# Patient Record
Sex: MALE | Race: WHITE | Employment: OTHER | URBAN - METROPOLITAN AREA
[De-identification: names, ages, dates, MRNs, and addresses within clinical notes are randomized per-mention and may not be internally consistent; named-entity substitution may affect disease eponyms.]

---

## 2017-07-20 DIAGNOSIS — F32.1 MAJOR DEPRESSIVE DISORDER, SINGLE EPISODE, MODERATE (H): Primary | ICD-10-CM

## 2017-07-20 LAB
DEPRECATED CALCIDIOL+CALCIFEROL SERPL-MC: 46 UG/L (ref 20–75)
FERRITIN SERPL-MCNC: 213 NG/ML (ref 26–388)
IRON SERPL-MCNC: 85 UG/DL (ref 35–180)
T4 FREE SERPL-MCNC: 0.82 NG/DL (ref 0.76–1.46)
TSH SERPL DL<=0.05 MIU/L-ACNC: 2.19 MU/L (ref 0.4–4)

## 2017-07-20 PROCEDURE — 83540 ASSAY OF IRON: CPT | Performed by: INTERNAL MEDICINE

## 2017-07-20 PROCEDURE — 84443 ASSAY THYROID STIM HORMONE: CPT | Performed by: INTERNAL MEDICINE

## 2017-07-20 PROCEDURE — 82728 ASSAY OF FERRITIN: CPT | Performed by: INTERNAL MEDICINE

## 2017-07-20 PROCEDURE — 36415 COLL VENOUS BLD VENIPUNCTURE: CPT | Performed by: INTERNAL MEDICINE

## 2017-07-20 PROCEDURE — 82306 VITAMIN D 25 HYDROXY: CPT | Performed by: INTERNAL MEDICINE

## 2017-07-20 PROCEDURE — 84439 ASSAY OF FREE THYROXINE: CPT | Performed by: INTERNAL MEDICINE

## 2019-05-02 NOTE — PROGRESS NOTES
" [facility]  [address], [address]  [place] [address]  New Patient Evaluation       Simon Mayers is a 48 year old male who prefers the name Simon and pronoun he.  Therapist: had until recently but did not feel good fit. Started with a new therapist recently.  PCP: Nelly Leroy  Other Providers: Sofia Keith listed,  Last saw one month, found her office inconvenient.  Referred by self for evaluation of depression.     History was provided by patient who was a good historian.      Chief Complaint                                                                                                        \" I just want a magic wand to cut through the fog \"     History of Present Illness                                                                                4, 4      Most recent history   \"I've been battling this since 2014.\" Cognitive \"fog\", able to follow routine, fatigue, hypersomnia, amotivation.  A lot of urge to withdraw, not so much agoraphobic, but just wanting to be at home.  Not really a sadness, more of a numbness, going through the motions.  Feels it is seasonal, worse in nation, \"they crush me\". Uses light box, not sure if it helps.  Reviewed his medications and past tx trials, see below.   SI: \"I;m tired of life, but I have family. Otherwise I'd take a bottle of pills and be done.\" Does think thse thoughts have increased, \"I think something could dramatically change and I might consider doing it.\"      Psychiatric Review of Symptoms:   Depression: Positive for, anhedonia, social isolation, loss of appetite,, hypersomnia, fatigue, feeling worthless, guilt, poor concentration, indecisiveness, passive thoughts of death, Anxiety: Positive for symptoms of anxiety including panic attacks, \"dull\" anxity, generalized worry, restlessness, fatigue, poor concentration, irritability, muscle tension and insomnia     Describes \"OCD\" at past points, but this is not persistent checking or " "ritualizing, it is more of an [employer] perfectionism, xiang around work. Also around picking care providers.      PTSD: Denies traumatic experience of sufficient severity to meet criterion A necessary for diagnosis of PTSD.   Zaida: Negative  Psychosis: Negative   Eating disorder: Negative  Homicidal Ideation: Negative         Recent Substance Use:  Off and on drinkign, as recently as a year ago \"I'd have five at a time\", currently \"I'm done\", not using alcohol.  Cannabis would sometimes reduce the \"funk\" but not reliably. Too often got high.  CBD helps reduce stress, xiang when working, not using recently.        Substance Use History     Past Use- Reports significant use of alcohol in response to mid-2010s stressors.  Otherwise, as above.       Psychiatric History     Suicidal ideation- see HPI   Suicide Attempt:   #- denies   Zaida- [place] \"high like I could do anything\", for a few months, sleeping 7hr/d, thinks may have been impulsively accepting jobs, make purchases with less thought/planning than usual. More goal-directed activity than usual. More seeking of sex, alcohol use. Might have correlated with escitalopram but not sure.    He thinks this has happened in the past \"But I'm sure mayra was involved.\"    Psychosis- None      Outpatient Programs [ DBT, Day Treatment, Eating Disorder Tx etc]- Started a day tx program at [facility] [place], did the 9-12 partial, found it very fatiguing, also did not find it sufficiently informative. Went for about a month then stopped.    Past diagnoses of adult ADD (possible), depression.       Psychiatric Medication Trials           Drug Duration (mos) Dose (mg) Helpful (Y/N) Adverse Effects DC Reason / Date   bupropion   N Felt he got agited, combo with Lexapro    Citalopram   Y,   \"like someone flipped a switch\" GI side effects    Fluoxetine  40mg Partial, \"got me functional\"  Still the current medication.   Escitalopram   Not as much as citalopram, but reduced anxiety  \"It just quit working " "last summer\"   Naltrexone (atop fluoxetine)   N     Stimulants    \"jacked me out of my mind\"                                               Social/ Family History               [per patient report]                                                 1ea, 1ea     FINANCIAL SUPPORT- working,        RELATIONSHIPS/CHILDREN- Has had some intimate/romantic relationships, but nothing recent or long term.       LIVING SITUATION- Has own home      LEGAL- None  EARLY HISTORY/ EDUCATION- Graduated college.  SOCIAL/ SPIRITUAL SUPPORT- UNKNOWN       CULTURAL INFLUENCES/ IMPACT- UNKNOWN       TRAUMA HISTORY (self-report)- Not so much Criterion A trauma, but many financial-social stressors in mid-2010s.  FEELS SAFE AT HOME- Yes  FAMILY HISTORY-  UNKNOWN       Medical / Surgical History     Patient Active Problem List   Diagnosis     PARTHA (obstructive sleep apnea)       Past Surgical History:   Procedure Laterality Date     HC REMOVAL OF TONSILS,14+ Y/O           Medical Review of Systems                                                                                                 2, 10     GENERAL: overall fatigue  HEENT: No changes in hearing or vision, no nose bleeds or other nasal problems  NECK: Negative for lumps, goiter, pain and significant neck swelling  RESPIRATORY: Negative for cough, wheezing and shortness of breath  CARDIOVASCULAR: Negative for chest pain, leg swelling and palpitations  GI: Negative for abdominal discomfort, blood in stools or black stools  : Negative for dysuria, frequency and incontinence  MUSCULOSKELETAL: Negative for joint pain or swelling, back pain, and muscle pain.  SKIN: Negative for lesions, rash, and itching.  PSYCH: See HPI  HEMATOLOGY/LYMPHOLOGY Negative for prolonged bleeding, bruising easily, and swollen nodes.  ENDOCRINE: Negative for cold or heat intolerance, polyuria, polydipsia and goiter.  NEURO:  No seizure, paralysis, involuntary movements      Metals Screen   Yes No " Item    X Implanted or lodged metals in body    X Implanted surgical devices    X Metal containing facial or scalp tattoos    X Non removable piercings   Seizure Screen  Yes No Item    X Current Seizure Disorder?    X History of Seizure?     Does patient have a cochlear implant? ____X______  Does patient have any shunts?____X_______  Does patient have a pacemaker?___X_______  Does patient have a vagus nerve stimulator?___X_______  Does patient have a deep brain stimulator?____X______  Any other implanted device?________X________       Allergy   No known drug allergies     Current Medications     Current Outpatient Medications   Medication Sig Dispense Refill     CITALOPRAM HYDROBROMIDE PO Take 40 mg by mouth daily        Ibuprofen (ADVIL PO) Take 600 mg by mouth daily       Omega-3 Fatty Acids (OMEGA-3 FISH OIL PO) Take 750 mg by mouth          Vitals                                                                                                                        3, 3     /76   Pulse 76        Mental Status Exam                                                                                   9, 14 cog gs     Alertness: alert  and oriented  Appearance: well groomed  Behavior/Demeanor: cooperative, pleasant and calm, with good  eye contact   Speech: normal and regular rate and rhythm  Language: intact  Psychomotor: normal or unremarkable  Mood: depressed and worried  Affect: restricted; was congruent to mood; was congruent to content  Thought Process/Associations: unremarkable  Thought Content:  Reports none;  Denies suicidal and violent ideation  Perception:  Reports none;  Denies auditory hallucinations and visual hallucinations  Insight: good  Judgment: good  Cognition: (6) does  appear grossly intact; formal cognitive testing was not done  Gait and Station: unremarkable     Labs and Data     Rating Scales:    Not done due to rooming difficulty    No lab results found.  No lab results  found.    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 He has some response to serotonergic agents, but appears to be substantially limited by side effects. At this point in his treatment, consideration of an alternate agent is reasonable. He is particularly interested in trying ketamine, given the potential for a rapid response that would then enable him to bridge to more traditional therapy. He is self-employed and has a schedule that could permit ketamine use.     The risks, benefits, alternatives and potential adverse effects have been explained and are understood by the patient. Simon Mayers agrees to the treatment plan with the ability to do so. The pt knows to call the clinic for any problems or access emergency care if needed. There are no medical considerations relevant to treatment, as noted above. Substance use is not a problem as noted above.       In parallel, use of augmenting strategies for his MDD is a very reasonable approach that is likely to boost remission.    The patient understands that treatment consists of up to 37 treatment sessions. The patient cannot miss more than two sessions during treatment course, or course will be invalidated. The patient may not drink any alcohol or use any illicit drugs during treatment. The patient may not make any medication changes during the course of treatment. After treatment is complete, the patient will transfer back to the referring provider.     Suicide Risk Assessment:  Today Simon Mayers reports passive but not active SI. There is no evidence for an elevated risk level requiring management.        Plan                                                                                                                     m2, h3     1) Major  depressive disorder, recurrent, severe  -- Medications:  He should consider augmentation of serotonin specific reuptake inhibitor. I would suggest the trial of the following strategies, in approximately this order:  1) Switch back to citalopram and see if it is tolerable without SEs.  2) If citalopram is not tolerable, switch to vortioxetine and maximize dose (chance for cognitive benefit)  3) Once either of the following is at maximum tolerated dose, add aripiprazole 5mg (or higher if tolerated) for augmentation  4) If no help from aripiprazole, add T3 as per STAR*D algorithm    -- Interventional psychiatry: he would prefer to try ketamine over TMS, assuming insurance will cover it. He has no preference on infusion vs nasal. We will begin prior authorization process.    RTC: As needed, or once an interventional treatment is scheduled.    CRISIS NUMBERS:   Provided routinely in AVS.    Treatment Risk Statement:  The patient understands the risks, benefits, adverse effects and alternatives. Agrees to treatment with the capacity to do so. No medical contraindications to treatment. Agrees to call clinic for any problems. The patient understands to call 911 or go to the nearest ED if life threatening or urgent symptoms occur.     WHODAS 2.0  TODAY total score = N/A; [a 12-item WHODAS 2.0 assessment was not completed by the pt today and/or recorded in EPIC].     PROVIDER:  Ernst Lincoln MD    Time based billing; 50min spent face to face with the patient with greater than 50% of time spent on  coordination of care, counseling and discussion of treatment alternatives.

## 2019-05-03 ENCOUNTER — OFFICE VISIT (OUTPATIENT)
Dept: PSYCHIATRY | Facility: CLINIC | Age: 49
End: 2019-05-03
Payer: COMMERCIAL

## 2019-05-03 VITALS — DIASTOLIC BLOOD PRESSURE: 76 MMHG | HEART RATE: 76 BPM | SYSTOLIC BLOOD PRESSURE: 128 MMHG

## 2019-05-03 DIAGNOSIS — F33.1 MODERATE EPISODE OF RECURRENT MAJOR DEPRESSIVE DISORDER (H): Primary | ICD-10-CM

## 2019-05-03 RX ORDER — FLUOXETINE 40 MG/1
40 CAPSULE ORAL DAILY
COMMUNITY
End: 2019-09-05

## 2019-05-03 NOTE — LETTER
"5/3/2019      RE: Simon Mayers  [address]  [facility] [id number]        [facility]  [address], [address]  [place] [address]  New Patient Evaluation       Simon Mayers is a 48 year old male who prefers the name Simon and pronoun brooks.  Therapist: had until recently but did not feel good fit. Started with a new therapist recently.  PCP: Nelly Leroy  Other Providers: Sofia Parker listed,  Last saw one month, found her office inconvenient.  Referred by self for evaluation of depression.     History was provided by patient who was a good historian.      Chief Complaint                                                                                                        \" I just want a magic wand to cut through the fog \"     History of Present Illness                                                                                4, 4      Most recent history   \"I've been battling this since 2014.\" Cognitive \"fog\", able to follow routine, fatigue, hypersomnia, amotivation.  A lot of urge to withdraw, not so much agoraphobic, but just wanting to be at home.  Not really a sadness, more of a numbness, going through the motions.  Feels it is seasonal, worse in [place], \"they crush me\". Uses light box, not sure if it helps.  Reviewed his medications and past tx trials, see below.   SI: \"I;m tired of life, but I have family. Otherwise I'd take a bottle of pills and be done.\" Does think thse thoughts have increased, \"I think something could dramatically change and I might consider doing it.\"      Psychiatric Review of Symptoms:   Depression: Positive for, anhedonia, social isolation, loss of appetite,, hypersomnia, fatigue, feeling worthless, guilt, poor concentration, indecisiveness, passive thoughts of death, Anxiety: Positive for symptoms of anxiety including panic attacks, \"dull\" anxity, generalized worry, restlessness, fatigue, poor concentration, irritability, muscle tension and " "insomnia     Describes \"OCD\" at past points, but this is not persistent checking or ritualizing, it is more of an [employer] perfectionism, xiang around work. Also around picking care providers.      PTSD: Denies traumatic experience of sufficient severity to meet criterion A necessary for diagnosis of PTSD.   Zaida: Negative  Psychosis: Negative   Eating disorder: Negative  Homicidal Ideation: Negative         Recent Substance Use:  Off and on drinkign, as recently as a year ago \"I'd have five at a time\", currently \"I'm done\", not using alcohol.  Cannabis would sometimes reduce the \"funk\" but not reliably. Too often got high.  CBD helps reduce stress, xiang when working, not using recently.        Substance Use History     Past Use- Reports significant use of alcohol in response to mid-2010s stressors.  Otherwise, as above.       Psychiatric History     Suicidal ideation- see HPI   Suicide Attempt:   #- denies   Zaida- [place] \"high like I could do anything\", for a few months, sleeping 7hr/d, thinks may have been impulsively accepting jobs, make purchases with less thought/planning than usual. More goal-directed activity than usual. More seeking of sex, alcohol use. Might have correlated with escitalopram but not sure.    He thinks this has happened in the past \"But I'm sure hilariabelkisjesús was involved.\"    Psychosis- None      Outpatient Programs [ DBT, Day Treatment, Eating Disorder Tx etc]- Started a day tx program at [facility] [place], did the 9-12 partial, found it very fatiguing, also did not find it sufficiently informative. Went for about a month then stopped.    Past diagnoses of adult ADD (possible), depression.       Psychiatric Medication Trials           Drug Duration (mos) Dose (mg) Helpful (Y/N) Adverse Effects DC Reason / Date   bupropion   N Felt he got agited, combo with Lexapro    Citalopram   Y,   \"like someone flipped a switch\" GI side effects    Fluoxetine  40mg Partial, \"got me functional\"  Still the current medication. " "  Escitalopram   Not as much as citalopram, but reduced anxiety  \"It just quit working last summer\"   Naltrexone (atop fluoxetine)   N     Stimulants    \"jacked me out of my mind\"                                               Social/ Family History               [per patient report]                                                 1ea, 1ea     FINANCIAL SUPPORT- working,        RELATIONSHIPS/CHILDREN- Has had some intimate/romantic relationships, but nothing recent or long term.       LIVING SITUATION- Has own home      LEGAL- None  EARLY HISTORY/ EDUCATION- Graduated college.  SOCIAL/ SPIRITUAL SUPPORT- UNKNOWN       CULTURAL INFLUENCES/ IMPACT- UNKNOWN       TRAUMA HISTORY (self-report)- Not so much Criterion A trauma, but many financial-social stressors in mid-2010s.  FEELS SAFE AT HOME- Yes  FAMILY HISTORY-  UNKNOWN       Medical / Surgical History     Patient Active Problem List   Diagnosis     PARTHA (obstructive sleep apnea)       Past Surgical History:   Procedure Laterality Date     HC REMOVAL OF TONSILS,14+ Y/O           Medical Review of Systems                                                                                                 2, 10     GENERAL: overall fatigue  HEENT: No changes in hearing or vision, no nose bleeds or other nasal problems  NECK: Negative for lumps, goiter, pain and significant neck swelling  RESPIRATORY: Negative for cough, wheezing and shortness of breath  CARDIOVASCULAR: Negative for chest pain, leg swelling and palpitations  GI: Negative for abdominal discomfort, blood in stools or black stools  : Negative for dysuria, frequency and incontinence  MUSCULOSKELETAL: Negative for joint pain or swelling, back pain, and muscle pain.  SKIN: Negative for lesions, rash, and itching.  PSYCH: See HPI  HEMATOLOGY/LYMPHOLOGY Negative for prolonged bleeding, bruising easily, and swollen nodes.  ENDOCRINE: Negative for cold or heat intolerance, polyuria, polydipsia and " goiter.  NEURO:  No seizure, paralysis, involuntary movements      Metals Screen   Yes No Item    X Implanted or lodged metals in body    X Implanted surgical devices    X Metal containing facial or scalp tattoos    X Non removable piercings   Seizure Screen  Yes No Item    X Current Seizure Disorder?    X History of Seizure?     Does patient have a cochlear implant? ____X______  Does patient have any shunts?____X_______  Does patient have a pacemaker?___X_______  Does patient have a vagus nerve stimulator?___X_______  Does patient have a deep brain stimulator?____X______  Any other implanted device?________X________       Allergy   No known drug allergies     Current Medications     Current Outpatient Medications   Medication Sig Dispense Refill     CITALOPRAM HYDROBROMIDE PO Take 40 mg by mouth daily        Ibuprofen (ADVIL PO) Take 600 mg by mouth daily       Omega-3 Fatty Acids (OMEGA-3 FISH OIL PO) Take 750 mg by mouth          Vitals                                                                                                                        3, 3     /76   Pulse 76        Mental Status Exam                                                                                   9, 14 cog gs     Alertness: alert  and oriented  Appearance: well groomed  Behavior/Demeanor: cooperative, pleasant and calm, with good  eye contact   Speech: normal and regular rate and rhythm  Language: intact  Psychomotor: normal or unremarkable  Mood: depressed and worried  Affect: restricted; was congruent to mood; was congruent to content  Thought Process/Associations: unremarkable  Thought Content:  Reports none;  Denies suicidal and violent ideation  Perception:  Reports none;  Denies auditory hallucinations and visual hallucinations  Insight: good  Judgment: good  Cognition: (6) does  appear grossly intact; formal cognitive testing was not done  Gait and Station: unremarkable     Labs and Data     Rating Scales:    Not  done due to rooming difficulty    No lab results found.  No lab results found.    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 He has some response to serotonergic agents, but appears to be substantially limited by side effects. At this point in his treatment, consideration of an alternate agent is reasonable. He is particularly interested in trying ketamine, given the potential for a rapid response that would then enable him to bridge to more traditional therapy. He is self-employed and has a schedule that could permit ketamine use.     The risks, benefits, alternatives and potential adverse effects have been explained and are understood by the patient. Simon Mayers agrees to the treatment plan with the ability to do so. The pt knows to call the clinic for any problems or access emergency care if needed. There  are no medical considerations relevant to treatment, as noted above. Substance use is not a problem as noted above.       In parallel, use of augmenting strategies for his MDD is a very reasonable approach that is likely to boost remission.    The patient understands that treatment consists of up to 37 treatment sessions. The patient cannot miss more than two sessions during treatment course, or course will be invalidated. The patient may not drink any alcohol or use any illicit drugs during treatment. The patient may not make any medication changes during the course of treatment. After treatment is complete, the patient will transfer back to the referring provider.     Suicide Risk Assessment:  Today Simon Mayers reports passive but not active SI. There is no evidence for an elevated risk level requiring management.        Plan                                                                                                                      m2, h3     1) Major depressive disorder, recurrent, severe  -- Medications:  He should consider augmentation of serotonin specific reuptake inhibitor. I would suggest the trial of the following strategies, in approximately this order:  1) Switch back to citalopram and see if it is tolerable without SEs.  2) If citalopram is not tolerable, switch to vortioxetine and maximize dose (chance for cognitive benefit)  3) Once either of the following is at maximum tolerated dose, add aripiprazole 5mg (or higher if tolerated) for augmentation  4) If no help from aripiprazole, add T3 as per STAR*D algorithm    -- Interventional psychiatry: he would prefer to try ketamine over TMS, assuming insurance will cover it. He has no preference on infusion vs nasal. We will begin prior authorization process.    RTC: As needed, or once an interventional treatment is scheduled.    CRISIS NUMBERS:   Provided routinely in AVS.    Treatment Risk Statement:  The patient understands the risks, benefits, adverse effects and alternatives. Agrees to treatment with the capacity to do so. No medical contraindications to treatment. Agrees to call clinic for any problems. The patient understands to call 911 or go to the nearest ED if life threatening or urgent symptoms occur.     WHODAS 2.0  TODAY total score = N/A; [a 12-item WHODAS 2.0 assessment was not completed by the pt today and/or recorded in EPIC].     PROVIDER:  Ernst Lincoln MD    Time based billing; 50min spent face to face with the patient with greater than 50% of time spent on  coordination of care, counseling and discussion of treatment alternatives.      Ernst Lincoln MD

## 2019-05-03 NOTE — LETTER
"5/3/2019       RE: Simon Mayers  [address]  [facility] [address]     Dear Colleague,    Thank you for referring your patient, Simon Mayers, to the [facility] at [facility]. Please see a copy of my visit note below. This includes medication recommendations.       [facility]  [address], [address]  [place] [address]  New Patient Evaluation       Simon Mayers is a 48 year old male who prefers the name Simon and pronoun brooks.  Therapist: had until recently but did not feel good fit. Started with a new therapist recently.  PCP: Nelly Leroy  Other Providers: Sofia Parker listed,  Last saw one month, found her office inconvenient.  Referred by self for evaluation of depression.     History was provided by patient who was a good historian.      Chief Complaint                                                                                                        \" I just want a magic wand to cut through the fog \"     History of Present Illness                                                                                4, 4      Most recent history   \"I've been battling this since 2014.\" Cognitive \"fog\", able to follow routine, fatigue, hypersomnia, amotivation.  A lot of urge to withdraw, not so much agoraphobic, but just wanting to be at home.  Not really a sadness, more of a numbness, going through the motions.  Feels it is seasonal, worse in nation, \"they crush me\". Uses light box, not sure if it helps.  Reviewed his medications and past tx trials, see below.   SI: \"I;m tired of life, but I have family. Otherwise I'd take a bottle of pills and be done.\" Does think thse thoughts have increased, \"I think something could dramatically change and I might consider doing it.\"      Psychiatric Review of Symptoms:   Depression: Positive for, anhedonia, social isolation, loss of appetite,, hypersomnia, fatigue, feeling worthless, guilt, poor " "concentration, indecisiveness, passive thoughts of death, Anxiety: Positive for symptoms of anxiety including panic attacks, \"dull\" anxity, generalized worry, restlessness, fatigue, poor concentration, irritability, muscle tension and insomnia     Describes \"OCD\" at past points, but this is not persistent checking or ritualizing, it is more of an [employer] perfectionism, xiang around work. Also around picking care providers.      PTSD: Denies traumatic experience of sufficient severity to meet criterion A necessary for diagnosis of PTSD.   Zaida: Negative  Psychosis: Negative   Eating disorder: Negative  Homicidal Ideation: Negative         Recent Substance Use:  Off and on drinkign, as recently as a year ago \"I'd have five at a time\", currently \"I'm done\", not using alcohol.  Cannabis would sometimes reduce the \"funk\" but not reliably. Too often got high.  CBD helps reduce stress, xiang when working, not using recently.        Substance Use History     Past Use- Reports significant use of alcohol in response to mid-2010s stressors.  Otherwise, as above.       Psychiatric History     Suicidal ideation- see HPI   Suicide Attempt:   #- denies   Zaida- [place] \"high like I could do anything\", for a few months, sleeping 7hr/d, thinks may have been impulsively accepting jobs, make purchases with less thought/planning than usual. More goal-directed activity than usual. More seeking of sex, alcohol use. Might have correlated with escitalopram but not sure.    He thinks this has happened in the past \"But I'm sure mayra was involved.\"    Psychosis- None      Outpatient Programs [ DBT, Day Treatment, Eating Disorder Tx etc]- [place] [facility] [place] did the 9-12 partial, found it very fatiguing, also did not find it sufficiently informative. Went for about a month then stopped.    Past diagnoses of adult ADD (possible), depression.       Psychiatric Medication Trials           Drug Duration (mos) Dose (mg) Helpful (Y/N) Adverse " "Effects DC Reason / Date   bupropion   N Felt he got agited, combo with Lexapro    Citalopram   Y,   \"like someone flipped a switch\" GI side effects    Fluoxetine  40mg Partial, \"got me functional\"  Still the current medication.   Escitalopram   Not as much as citalopram, but reduced anxiety  \"It just quit working last summer\"   Naltrexone (atop fluoxetine)   N     Stimulants    \"jacked me out of my mind\"                                               Social/ Family History               [per patient report]                                                 1ea, 1ea     FINANCIAL SUPPORT- working,        RELATIONSHIPS/CHILDREN- Has had some intimate/romantic relationships, but nothing recent or long term.       LIVING SITUATION- Has own home      LEGAL- None  EARLY HISTORY/ EDUCATION- Graduated college.  SOCIAL/ SPIRITUAL SUPPORT- UNKNOWN       CULTURAL INFLUENCES/ IMPACT- UNKNOWN       TRAUMA HISTORY (self-report)- Not so much Criterion A trauma, but many financial-social stressors in mid-2010s.  FEELS SAFE AT HOME- Yes  FAMILY HISTORY-  UNKNOWN       Medical / Surgical History     Patient Active Problem List   Diagnosis     PARTHA (obstructive sleep apnea)       Past Surgical History:   Procedure Laterality Date     HC REMOVAL OF TONSILS,14+ Y/O           Medical Review of Systems                                                                                                 2, 10     GENERAL: overall fatigue  HEENT: No changes in hearing or vision, no nose bleeds or other nasal problems  NECK: Negative for lumps, goiter, pain and significant neck swelling  RESPIRATORY: Negative for cough, wheezing and shortness of breath  CARDIOVASCULAR: Negative for chest pain, leg swelling and palpitations  GI: Negative for abdominal discomfort, blood in stools or black stools  : Negative for dysuria, frequency and incontinence  MUSCULOSKELETAL: Negative for joint pain or swelling, back pain, and muscle pain.  SKIN: " Negative for lesions, rash, and itching.  PSYCH: See HPI  HEMATOLOGY/LYMPHOLOGY Negative for prolonged bleeding, bruising easily, and swollen nodes.  ENDOCRINE: Negative for cold or heat intolerance, polyuria, polydipsia and goiter.  NEURO:  No seizure, paralysis, involuntary movements      Metals Screen   Yes No Item    X Implanted or lodged metals in body    X Implanted surgical devices    X Metal containing facial or scalp tattoos    X Non removable piercings   Seizure Screen  Yes No Item    X Current Seizure Disorder?    X History of Seizure?     Does patient have a cochlear implant? ____X______  Does patient have any shunts?____X_______  Does patient have a pacemaker?___X_______  Does patient have a vagus nerve stimulator?___X_______  Does patient have a deep brain stimulator?____X______  Any other implanted device?________X________       Allergy   No known drug allergies     Current Medications     Current Outpatient Medications   Medication Sig Dispense Refill     CITALOPRAM HYDROBROMIDE PO Take 40 mg by mouth daily        Ibuprofen (ADVIL PO) Take 600 mg by mouth daily       Omega-3 Fatty Acids (OMEGA-3 FISH OIL PO) Take 750 mg by mouth          Vitals                                                                                                                        3, 3     /76   Pulse 76        Mental Status Exam                                                                                   9, 14 cog gs     Alertness: alert  and oriented  Appearance: well groomed  Behavior/Demeanor: cooperative, pleasant and calm, with good  eye contact   Speech: normal and regular rate and rhythm  Language: intact  Psychomotor: normal or unremarkable  Mood: depressed and worried  Affect: restricted; was congruent to mood; was congruent to content  Thought Process/Associations: unremarkable  Thought Content:  Reports none;  Denies suicidal and violent ideation  Perception:  Reports none;  Denies auditory  hallucinations and visual hallucinations  Insight: good  Judgment: good  Cognition: (6) does  appear grossly intact; formal cognitive testing was not done  Gait and Station: unremarkable     Labs and Data     Rating Scales:    Not done due to rooming difficulty    No lab results found.  No lab results found.    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 He has some response to serotonergic agents, but appears to be substantially limited by side effects. At this point in his treatment, consideration of an alternate agent is reasonable. He is particularly interested in trying ketamine, given the potential for a rapid response that would then enable him to bridge to more traditional therapy. He is self-employed and has a schedule that could permit ketamine use.     The risks, benefits, alternatives and potential adverse effects have been explained and are understood by the patient. Simon Mayers agrees to the treatment plan with the ability to do so. The pt knows to call the clinic for any problems or access emergency care if needed. There  are no medical considerations relevant to treatment, as noted above. Substance use is not a problem as noted above.       In parallel, use of augmenting strategies for his MDD is a very reasonable approach that is likely to boost remission.    The patient understands that treatment consists of up to 37 treatment sessions. The patient cannot miss more than two sessions during treatment course, or course will be invalidated. The patient may not drink any alcohol or use any illicit drugs during treatment. The patient may not make any medication changes during the course of treatment. After treatment is complete, the patient will transfer back to the referring provider.      Suicide Risk Assessment:  Today Simon Mayers reports passive but not active SI. There is no evidence for an elevated risk level requiring management.        Plan                                                                                                                     m2, h3     1) Major depressive disorder, recurrent, severe  -- Medications:  He should consider augmentation of serotonin specific reuptake inhibitor. I would suggest the trial of the following strategies, in approximately this order:  1) Switch back to citalopram and see if it is tolerable without SEs.  2) If citalopram is not tolerable, switch to vortioxetine and maximize dose (chance for cognitive benefit)  3) Once either of the following is at maximum tolerated dose, add aripiprazole 5mg (or higher if tolerated) for augmentation  4) If no help from aripiprazole, add T3 as per STAR*D algorithm    -- Interventional psychiatry: he would prefer to try ketamine over TMS, assuming insurance will cover it. He has no preference on infusion vs nasal. We will begin prior authorization process.    RTC: As needed, or once an interventional treatment is scheduled.    CRISIS NUMBERS:   Provided routinely in AVS.    Treatment Risk Statement:  The patient understands the risks, benefits, adverse effects and alternatives. Agrees to treatment with the capacity to do so. No medical contraindications to treatment. Agrees to call clinic for any problems. The patient understands to call 911 or go to the nearest ED if life threatening or urgent symptoms occur.     WHODAS 2.0  TODAY total score = N/A; [a 12-item WHODAS 2.0 assessment was not completed by the pt today and/or recorded in EPIC].     PROVIDER:  Ernst Lincoln MD    Time based billing; 50min spent face to face with the patient with greater than 50% of time spent on  coordination of care, counseling and discussion of treatment alternatives.      Again, thank you for allowing me to participate in the  care of your patient.      Sincerely,    Ernst Lincoln MD

## 2019-05-15 ENCOUNTER — TELEPHONE (OUTPATIENT)
Dept: PSYCHIATRY | Facility: CLINIC | Age: 49
End: 2019-05-15

## 2019-05-15 DIAGNOSIS — F33.1 MODERATE EPISODE OF RECURRENT MAJOR DEPRESSIVE DISORDER (H): Primary | ICD-10-CM

## 2019-05-15 NOTE — TELEPHONE ENCOUNTER
What is the concern that needs to be addressed by a nurse? Wants to discuss ketamine and TMS options. And F/U from last visit.     May a detailed message be left on voicemail? yes    Date of last office visit:     Message routed to: [facility]

## 2019-05-16 NOTE — TELEPHONE ENCOUNTER
-Writer spoke with Simon regarding medications suggestions and sent them to him via [employer].  Simon is interested in Ketamine and TMS.  Will contact Dr. Lincoln to get orders, so auth process can get started.

## 2019-05-31 NOTE — TELEPHONE ENCOUNTER
Ernst Lincoln MD Swanson, Melanie A, RN   Caller: Unspecified (2 weeks ago)             Let us proceed with authorization for esketamine first, then rTMS if esketamine denied.   Do you axtually need me to put in the esketamine Rx to do prior auth

## 2019-06-03 NOTE — TELEPHONE ENCOUNTER
-Writer called and spoke with Simon.  Discussed plan with per Dr. Lincoln.  He agreed to plan.  He is wondering if when he is done with the treatment here if he could continue to see our Dr's.  Informed him we were only a consultative program, but he could call [facility] to getting on a waiting list there.

## 2019-06-04 ENCOUNTER — TELEPHONE (OUTPATIENT)
Dept: PSYCHIATRY | Facility: CLINIC | Age: 49
End: 2019-06-04

## 2019-06-04 NOTE — TELEPHONE ENCOUNTER
-Writer sent enrollment forms to pharmacy, so they can run esketamine to see if covered by insurance.

## 2019-06-19 ENCOUNTER — TELEPHONE (OUTPATIENT)
Dept: PSYCHIATRY | Facility: CLINIC | Age: 49
End: 2019-06-19

## 2019-06-19 NOTE — TELEPHONE ENCOUNTER
Central Prior Authorization Team   Phone: [phone]    PA Initiation    Medication: Esketamine   Insurance Company: [employer] - Phone [phone] Fax [phone]  Pharmacy Filling the Rx: [id number] - [place], [place] - [address] [place] [address]  Filling Pharmacy Phone: [phone]  Filling Pharmacy Fax:    Start Date: 6/19/2019

## 2019-06-19 NOTE — TELEPHONE ENCOUNTER
Prior Authorization Retail Medication Request    Medication/Dose: Esketamine 56 mg   ICD code (if different than what is on RX):  F33.1  Previously Tried and Failed:  See chart  Rationale:  Failed more than 2 antidepressants     Insurance Name:     Insurance ID:  [id number]      Pharmacy Information (if different than what is on RX)  Name:  [facility]  Phone:  [phone]

## 2019-06-24 NOTE — TELEPHONE ENCOUNTER
PRIOR AUTHORIZATION DENIED    Medication: Esketamine - denied    Denial Date: 6/24/2019    Denial Rational: script is denied because pt does not meet medical criteria, medication is available under pt's medical benefits                      Appeal Information:

## 2019-06-26 ENCOUNTER — TELEPHONE (OUTPATIENT)
Dept: PSYCHIATRY | Facility: CLINIC | Age: 49
End: 2019-06-26

## 2019-08-20 ENCOUNTER — HOSPITAL ENCOUNTER (EMERGENCY)
Facility: CLINIC | Age: 49
Discharge: HOME | End: 2019-08-20
Admitting: PHYSICIAN ASSISTANT
Payer: COMMERCIAL

## 2019-08-20 VITALS
SYSTOLIC BLOOD PRESSURE: 158 MMHG | OXYGEN SATURATION: 100 % | HEIGHT: 72 IN | RESPIRATION RATE: 16 BRPM | BODY MASS INDEX: 25.73 KG/M2 | HEART RATE: 68 BPM | DIASTOLIC BLOOD PRESSURE: 97 MMHG | TEMPERATURE: 98.6 F | WEIGHT: 190 LBS

## 2019-08-20 DIAGNOSIS — F33.2 SEVERE EPISODE OF RECURRENT MAJOR DEPRESSIVE DISORDER, WITHOUT PSYCHOTIC FEATURES (H): ICD-10-CM

## 2019-08-20 PROCEDURE — 99282 EMERGENCY DEPT VISIT SF MDM: CPT

## 2019-08-20 ASSESSMENT — MIFFLIN-ST. JEOR: SCORE: 1779.83

## 2019-08-20 ASSESSMENT — ENCOUNTER SYMPTOMS
SLEEP DISTURBANCE: 1
AGITATION: 1
DYSPHORIC MOOD: 1
HALLUCINATIONS: 0
DECREASED CONCENTRATION: 1

## 2019-08-20 NOTE — ED AVS SNAPSHOT
Emergency Department  [address] [place] [address]  Phone:  [phone]  Fax:  [phone]                                    Simon Mayers   MRN: [id number]    Department:   Emergency Department   Date of Visit:  8/20/2019           After Visit Summary Signature Page    I have received my discharge instructions, and my questions have been answered. I have discussed any challenges I see with this plan with the nurse or doctor.    ..........................................................................................................................................  Patient/Patient Representative Signature      ..........................................................................................................................................  Patient Representative Print Name and Relationship to Patient    ..................................................               ................................................  Date                                   Time    ..........................................................................................................................................  Reviewed by Signature/Title    ...................................................              ..............................................  Date                                               Time          22EPIC Rev 08/18

## 2019-08-20 NOTE — DISCHARGE INSTRUCTIONS
Follow up with Hank.   I did place orders for outpatient therapy and/or medication management.   Take Benadryl or Melatonin to make you sleep!  Return here immediately if you have any problems with suicide.     Discharge Instructions  Mental Health Concerns    You were seen today for mental health concerns, such as depression, anxiety, or suicidal thinking. Your provider feels that you do not require hospitalization at this time. However, your symptoms may become worse, and you may need to return to the Emergency Department. Most treatments of depression and suicidal thoughts are a process rather than a single intervention.  Medications and counseling can take several weeks or more to help.    Generally, every Emergency Department visit should have a follow-up clinic visit with either a primary or a specialty clinic/provider. Please follow-up as instructed by your emergency provider today.    By accepting these discharge instructions:  You promise to not harm yourself or others.  You agree that if you feel you are becoming unable to keep that promise, you will do something to help yourself before you do anything to harm yourself or others.   You agree to keep any safety plan arranged on your visit here today.  You agree to take any medication prescribed or recommended by your provider.  If you are getting worse, you can contact a friend or a family member, contact your counselor or family provider, contact a crisis line, or other options discussed with the provider or therapist today.  At any time, you can call 911 and return to the Emergency Department for more help.  You understand that follow-up is essential to your treatment, and you will make and keep appointments recommended on your visit today.    How to improve your mental health and prevent suicide:  Involve others by letting family, friends, counselors know.  Do not isolate yourself.  Avoid alcohol or drugs. Remove weapons, poisons from your home.  Try  to stick to routines for eating, sleeping and getting regular exercise.    Try to get into sunlight. Bright natural light not only treats seasonal affective disorder but also depression.  Increase safe activities that you enjoy.    If you feel worse, contact [phone] ([phone]), or call 911, or your primary provider/counselor for additional assistance.    If you were given a prescription for medicine here today, be sure to read all of the information (including the package insert) that comes with your prescription.  This will include important information about the medicine, its side effects, and any warnings that you need to know about.  The pharmacist who fills the prescription can provide more information and answer questions you may have about the medicine.  If you have questions or concerns that the pharmacist cannot address, please call or return to the Emergency Department.   Remember that you can always come back to the Emergency Department if you are not able to see your regular provider in the amount of time listed above, if you get any new symptoms, or if there is anything that worries you.

## 2019-08-20 NOTE — ED TRIAGE NOTES
Recent medication changes, changed to Abilify recently. Pt has had several thoughts of not wanting to wake up but not formulated plan.

## 2019-08-20 NOTE — ED PROVIDER NOTES
"  History     Chief Complaint:  Depression      HPI   Simon Mayers is a 48 year old male with a history of depression who presents to the emergency department for evaluation of depression. The patient states that, over the past 3 weeks, he has been transitioning to taking Abilify for his depression without any impact. Prior to taking Abilify, he was most recently transitioned from Citalopram to Elavil. Patient notes ongoing medication adjustments by his psychiatrist, Dr. Sofia Carroll at the [facility], for his ongoing depression. Thus far, he has had varying success with medications--the Citalopram did not work well for him, which is why he was moved to Elavil. While on the Elavil, he felt that he was able to sleep better, but his irritability and anger abnormally spiked. Therefore, he was started on Abilify about three weeks ago. While on Abilify, he has not been able to sleep at all, essentially, but has felt more productive at home. He thinks that his productivity is to an unhealthy level, however, and he struggles to leave his home to go to work still. He also notes increasingly disturbing dreams on the Abilify.     Outside of these three medications, in the past he has tried many others without success. These include Prozac and Lexapro, but he cannot remember any others. He states that he comes in today because he is at \"his wits end.\" He has been unhappy with the [facility] for a while, and recently started going to [facility]. While he is much more pleased with the care he has received there, he cannot see anyone about medications for the next 6 days and states that he is really struggling. Currently, he is taking half a tablet of his Abilify every other day. He called [facility] to be seen earlier than that, but they instead recommended he come to the emergency department to see a hospital psychiatrist. Patient states that his goal at his appointment in 6 days is " to find a medication that will work better for him than the Abilify does. He expresses interest in Ketamine, but adds that he could not afford this without insurance coverage. On evaluation, patient endorses ongoing dysphoric mood, somewhat worsened irritability, sleep disturbances, lack of interest in his daily activities, and overall frustration. He is also smoking more than he used to, and is having to use Medical Marijuana CBD for sleeping. Patient denies hallucinations, active SI with a plan, and denies HI.     Allergies:  No Known Drug Allergies      Medications:    Abilify     Past Medical History:    Anxiety and depression    Past Surgical History:    Tonsillectomy     Family History:    Father - coronary artery disease, diabetes   Brother - hypertension    Social History:  The patient was alone  Smoking Status: Light tobacco smoker  Smokeless Tobacco: Never  Alcohol Use: Yes   Drug use: No    Marital Status:  Single      Review of Systems   Psychiatric/Behavioral: Positive for agitation, behavioral problems, decreased concentration, dysphoric mood and sleep disturbance. Negative for hallucinations and suicidal ideas.   All other systems reviewed and are negative.    Physical Exam     Patient Vitals for the past 24 hrs:   BP Temp Temp src Pulse Resp SpO2 Height Weight   08/20/19 1422 (!) 158/97 98.6  F (37  C) Oral 68 16 100 % 1.829 m (6') 86.2 kg (190 lb)      Physical Exam  General: Alert and interactive. Appears well. Cooperative and pleasant.   Eyes: The pupils are equal and round. EOMs intact. No scleral icterus.  ENT: No abnormalities to the external nose or ears. Mucous membranes moist. Posterior oropharynx is non-erythematous.      Neck: Trachea is in the midline. No nuchal rigidity.     CV: Regular rate and rhythm. S1 and S2 normal without murmur, click, gallop or rub.   Resp: Breath sounds are clear bilaterally, without rhonchi, wheezes, rales. Non-labored, no retractions or accessory muscle use.      GI: Abdomen is soft without distension. No tenderness to palpation. No peritoneal signs.    MS: Moving all extremities well. Good muscle tone.   Skin: Warm and dry. No rash or lesions noted.  Neuro: Alert and oriented x 3. No focal neurologic deficits. Good strength and sensation in upper and lower extremities.    Psych: Awake. Alert. Normal tone of voice. Normal interactions. Flat affect. Depressed mood. No suicidal or homicidal ideations. No visual or auditory hallucinations.   Lymph: No anterior or posterior cervical lymphadenopathy noted.    Emergency Department Course     Emergency Department Course:  Past medical records, nursing notes, and vitals reviewed.    1530: I performed an exam of the patient as documented above.     Findings and plan explained to the Patient. Patient discharged home with instructions regarding supportive care, medications, and reasons to return. The importance of close follow-up was reviewed.    Impression & Plan     Medical Decision Making:  Simon Mayers is a 48 year old male with a history of depression managed by the [facility], currently switching to [facility] and [facility], who presents for evaluation of depression. He was sent here by the [facility] given his severe depression and inability to function. He currently has no suicidal or homicidal ideations, and he is not seeing or hearing anything unusual. He does not meet criteria for inpatient admission--I do not think that he is acutely at risk for ham to himself or others. There are no signs of metabolic or infectious etiology for his depression. He denies drug use. He feels safe at home and feels as though he has enough hope to get him through until his appointment at [facility] on Monday.     I did place the outpatient [facility] referral in the computer, and someone will call him within the next 24-48 hours to get him set up for therapy and or medication reconciliation. He is only taking 0.5 pill of Abilify every other day,  and I think it is okay for him to stop this until follow up with [facility] if it is making him worse. No point in starting mediations here from the Emergency Department, as they will not take effect for one week, and I will not see him in follow up. Suggested benadryl or melatonin to assist with sleep. Certainly, if the patient has any acute suicidal ideations or does not feel like he can function over the course of the next week, he should return here immediately. He understands this and was given mental health discharge paperwork.      Discharge Diagnosis:    ICD-10-CM    1. Severe episode of recurrent major depressive disorder, without psychotic features (H) F33.2        Disposition:  Discharged to home.     Scribe Disclosure:  I, Becky Sepulveda, am serving as a scribe at 3:21 PM on 8/20/2019 to document services personally performed by Lupis Izaguirre PA-C based on my observations and the provider's statements to me.     8/20/2019  No att. providers found8/20/2019    EMERGENCY DEPARTMENT       Lupis Izaguirre PA-C  08/20/19 1314

## 2019-09-05 ENCOUNTER — HOSPITAL ENCOUNTER (EMERGENCY)
Facility: CLINIC | Age: 49
Discharge: HOME | End: 2019-09-05
Attending: PSYCHIATRY & NEUROLOGY | Admitting: PSYCHIATRY & NEUROLOGY
Payer: COMMERCIAL

## 2019-09-05 ENCOUNTER — OFFICE VISIT (OUTPATIENT)
Dept: INTERNAL MEDICINE | Facility: CLINIC | Age: 49
End: 2019-09-05
Payer: COMMERCIAL

## 2019-09-05 VITALS
WEIGHT: 179 LBS | DIASTOLIC BLOOD PRESSURE: 86 MMHG | OXYGEN SATURATION: 99 % | BODY MASS INDEX: 24.28 KG/M2 | HEART RATE: 75 BPM | SYSTOLIC BLOOD PRESSURE: 123 MMHG

## 2019-09-05 VITALS
HEART RATE: 76 BPM | WEIGHT: 179 LBS | BODY MASS INDEX: 24.28 KG/M2 | OXYGEN SATURATION: 98 % | TEMPERATURE: 96.1 F | DIASTOLIC BLOOD PRESSURE: 87 MMHG | RESPIRATION RATE: 16 BRPM | SYSTOLIC BLOOD PRESSURE: 125 MMHG

## 2019-09-05 DIAGNOSIS — F43.10 PTSD (POST-TRAUMATIC STRESS DISORDER): ICD-10-CM

## 2019-09-05 DIAGNOSIS — Z13.9 SCREENING FOR CONDITION: ICD-10-CM

## 2019-09-05 DIAGNOSIS — F32.A DEPRESSION, UNSPECIFIED DEPRESSION TYPE: Primary | ICD-10-CM

## 2019-09-05 DIAGNOSIS — F32.A DEPRESSION, UNSPECIFIED DEPRESSION TYPE: ICD-10-CM

## 2019-09-05 DIAGNOSIS — Z00.00 ROUTINE HISTORY AND PHYSICAL EXAMINATION OF ADULT: ICD-10-CM

## 2019-09-05 DIAGNOSIS — Z76.89 ENCOUNTER TO ESTABLISH CARE WITH NEW DOCTOR: ICD-10-CM

## 2019-09-05 DIAGNOSIS — F41.9 ANXIETY: ICD-10-CM

## 2019-09-05 LAB
ALBUMIN SERPL-MCNC: 4.1 G/DL (ref 3.4–5)
ALP SERPL-CCNC: 63 U/L (ref 40–150)
ALT SERPL W P-5'-P-CCNC: 37 U/L (ref 0–70)
AMPHETAMINES UR QL SCN: NEGATIVE
ANION GAP SERPL CALCULATED.3IONS-SCNC: 7 MMOL/L (ref 3–14)
AST SERPL W P-5'-P-CCNC: 21 U/L (ref 0–45)
BARBITURATES UR QL: NEGATIVE
BENZODIAZ UR QL: NEGATIVE
BILIRUB SERPL-MCNC: 0.9 MG/DL (ref 0.2–1.3)
BUN SERPL-MCNC: 17 MG/DL (ref 7–30)
CALCIUM SERPL-MCNC: 9.4 MG/DL (ref 8.5–10.1)
CANNABINOIDS UR QL SCN: POSITIVE
CHLORIDE SERPL-SCNC: 102 MMOL/L (ref 94–109)
CO2 SERPL-SCNC: 26 MMOL/L (ref 20–32)
COCAINE UR QL: NEGATIVE
CREAT SERPL-MCNC: 1.16 MG/DL (ref 0.66–1.25)
DEPRECATED CALCIDIOL+CALCIFEROL SERPL-MC: 58 UG/L (ref 20–75)
ERYTHROCYTE [DISTWIDTH] IN BLOOD BY AUTOMATED COUNT: 12.6 % (ref 10–15)
ETHANOL UR QL SCN: NEGATIVE
GFR SERPL CREATININE-BSD FRML MDRD: 75 ML/MIN/{1.73_M2}
GLUCOSE SERPL-MCNC: 83 MG/DL (ref 70–99)
HCT VFR BLD AUTO: 50.1 % (ref 40–53)
HGB BLD-MCNC: 17.1 G/DL (ref 13.3–17.7)
MCH RBC QN AUTO: 30.5 PG (ref 26.5–33)
MCHC RBC AUTO-ENTMCNC: 34.1 G/DL (ref 31.5–36.5)
MCV RBC AUTO: 89 FL (ref 78–100)
OPIATES UR QL SCN: NEGATIVE
PLATELET # BLD AUTO: 188 10E9/L (ref 150–450)
POTASSIUM SERPL-SCNC: 3.9 MMOL/L (ref 3.4–5.3)
PROT SERPL-MCNC: 7.8 G/DL (ref 6.8–8.8)
RBC # BLD AUTO: 5.61 10E12/L (ref 4.4–5.9)
SODIUM SERPL-SCNC: 136 MMOL/L (ref 133–144)
T4 FREE SERPL-MCNC: 1.21 NG/DL (ref 0.76–1.46)
TSH SERPL DL<=0.005 MIU/L-ACNC: 5.68 MU/L (ref 0.4–4)
VIT B12 SERPL-MCNC: 771 PG/ML (ref 193–986)
WBC # BLD AUTO: 9.6 10E9/L (ref 4–11)

## 2019-09-05 PROCEDURE — 90791 PSYCH DIAGNOSTIC EVALUATION: CPT

## 2019-09-05 PROCEDURE — 99284 EMERGENCY DEPT VISIT MOD MDM: CPT | Mod: Z6 | Performed by: PSYCHIATRY & NEUROLOGY

## 2019-09-05 PROCEDURE — 80320 DRUG SCREEN QUANTALCOHOLS: CPT | Performed by: FAMILY MEDICINE

## 2019-09-05 PROCEDURE — 80307 DRUG TEST PRSMV CHEM ANLYZR: CPT | Performed by: FAMILY MEDICINE

## 2019-09-05 PROCEDURE — 99285 EMERGENCY DEPT VISIT HI MDM: CPT | Mod: 25 | Performed by: PSYCHIATRY & NEUROLOGY

## 2019-09-05 RX ORDER — HYDROXYZINE HYDROCHLORIDE 50 MG/1
50 TABLET, FILM COATED ORAL DAILY
COMMUNITY
Start: 2018-10-10

## 2019-09-05 RX ORDER — GABAPENTIN 100 MG/1
CAPSULE ORAL
Qty: 150 CAPSULE | Refills: 0 | Status: SHIPPED | OUTPATIENT
Start: 2019-09-05

## 2019-09-05 RX ORDER — AMITRIPTYLINE HYDROCHLORIDE 50 MG/1
50 TABLET ORAL DAILY
COMMUNITY
Start: 2019-08-29

## 2019-09-05 RX ORDER — KETOCONAZOLE 20 MG/G
CREAM TOPICAL PRN
COMMUNITY
Start: 2004-04-01

## 2019-09-05 ASSESSMENT — PAIN SCALES - GENERAL: PAINLEVEL: NO PAIN (0)

## 2019-09-05 ASSESSMENT — PATIENT HEALTH QUESTIONNAIRE - PHQ9
5. POOR APPETITE OR OVEREATING: NEARLY EVERY DAY
SUM OF ALL RESPONSES TO PHQ QUESTIONS 1-9: 27

## 2019-09-05 ASSESSMENT — ANXIETY QUESTIONNAIRES
GAD7 TOTAL SCORE: 20
7. FEELING AFRAID AS IF SOMETHING AWFUL MIGHT HAPPEN: NEARLY EVERY DAY
IF YOU CHECKED OFF ANY PROBLEMS ON THIS QUESTIONNAIRE, HOW DIFFICULT HAVE THESE PROBLEMS MADE IT FOR YOU TO DO YOUR WORK, TAKE CARE OF THINGS AT HOME, OR GET ALONG WITH OTHER PEOPLE: EXTREMELY DIFFICULT
1. FEELING NERVOUS, ANXIOUS, OR ON EDGE: NEARLY EVERY DAY
2. NOT BEING ABLE TO STOP OR CONTROL WORRYING: NEARLY EVERY DAY
3. WORRYING TOO MUCH ABOUT DIFFERENT THINGS: NEARLY EVERY DAY
5. BEING SO RESTLESS THAT IT IS HARD TO SIT STILL: MORE THAN HALF THE DAYS
6. BECOMING EASILY ANNOYED OR IRRITABLE: NEARLY EVERY DAY

## 2019-09-05 ASSESSMENT — ENCOUNTER SYMPTOMS
SHORTNESS OF BREATH: 0
ABDOMINAL PAIN: 0
FEVER: 0
DYSPHORIC MOOD: 1
NERVOUS/ANXIOUS: 1
HALLUCINATIONS: 0

## 2019-09-05 NOTE — NURSING NOTE
Chief Complaint   Patient presents with     Establish Care     Physical     Pt is here for an annual physical.      Anxiety     Depression     Sandra Moreira LPN at 7:02 AM on 9/5/2019.
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
## 2019-09-05 NOTE — PATIENT INSTRUCTIONS
[facility]         Internal Medicine Resident                   Continuity Clinic    Who We Are    Resident Continuity Clinic is a part of the [facility] Primary [facility].  Resident physicians see patients independently and establish a relationship with them over the course of their three-year residency program.  As with the Primary Care Clinic, our Resident Continuity Clinic models a group practice.  If your doctor is not available, you will be able to see another resident physician.  At the end of a resident s training, patients will be transitioned to a new resident physician for ongoing care.     We treat patients with a wide array of medical needs from routine physicals, to acute illnesses, to diabetes and blood pressure management, to complex medical illness.  What is a Resident Physician?    Resident physicians hold medical degrees and are doctors. They are training to become specialists in Internal Medicine. They work under the supervision of board-certified faculty physicians.  Expectations for Your Care    We strive to provide accessible, quality care at all times.    In order to provide this care, it is best to see your primary care resident doctor consistently rather switching between providers.  In the event you do see another physician, you should schedule a follow-up visit with your usual primary care doctor.    If you are transitioning your care from another clinic, it is helpful to have your records available for your doctor to review.    We do not prescribe controlled substances, such as ADD medications or narcotic pain medications, on your first visit.  We will review your health records and concerns prior to devising a treatment plan with you in order to provide the best care.      Clinic Services     Extended clinic hours; patient  to help navigate your visit;  parking; laboratory and imaging services with evening and weekend hours    Multiple medical and  surgical specialties in one building    Complementary services, including Nutrition, Integrative Medicine, Pharmacy consultations, Mental and Behavioral Health, Sports Medicine and Physical Therapy    Thank You    We would like to thank you for choosing the [facility] Internal Medicine Resident Continuity Clinic for your primary care. You are making a priceless contribution to the training of the next generation of health care practitioners.     Contact us at [phone] for appointments or questions.    Resident Clinic Hours are Tuesdays and Thursdays, 7:30am-5:00pm    Residents   Asim Salmon MD  (Male)   Derek Hummel MD   (Male)   Catalina Duron MD  (Female)  May Ortiz MD   (Female)   Maryam Toscano MD   (Female)    Victor Manuel Burris MD    (Female)   Harry Christianson MD  (Male)   David Patel MD  (Male)    Harriet De La Torre MD  (Female)   Geoff Silva MD  (Female)   Bear Barker MD    (Female)   Sami Rojo MD  (Male)   Demetris Juan MD  (Male)   Bartolo Grant MD  (Male)   ALEYDA Morris MD   (Male)   Jairo Valencia MD  (Male)    Isela Paz MD (Female)   Orlando Anaya MD  (Male)   Regi Zaman MD  (Male)   Libia Schmidt MD  (Male)   Fely Brenner MD    (Female)   Murali Manriquez MD  (Female)     Supervising Physicians   MD Timo Carreno MD Jill Bowman Peterson, MD Briar Duffy, MD James Langland, MD Mary Logeais, MD Tanya Melnik, MD Charles Moldow, MD Heather Thompson Buum, MD          Primary Care Center Medication Refill Request Information:  * Please contact your pharmacy regarding ANY request for medication refills.  ** [facility] Prescription Fax = [phone]  * Please allow 3 business days for routine medication refills.  * Please allow 5 business days for controlled substance medication refills.     Primary Care Center Test Result notification information:  *You will be notified with in 7-10 days of your appointment day regarding the results  of your test.  If you are on MyChart you will be notified as soon as the provider has reviewed the results and signed off on them.    [facility]: [phone]

## 2019-09-05 NOTE — DISCHARGE INSTRUCTIONS
Stop drinking    Take 2 more days of elavil at 25 mg then stop it    Start gabapentin 100 mg in the morning, 100 mg mid afternoon and 300 mg at bedtime    Continue hydroxyzine as prescribed    Follow up with therapy and psychiatry    Consider DBT through [facility] and consider day treatment through [facility]

## 2019-09-05 NOTE — ED AVS SNAPSHOT
[facility], [place], Emergency Department  [address]  [facility] [address]  Phone:  [phone]  Fax:  [phone]                                    Simon Mayers   MRN: [id number]    Department:  [facility], Emergency Department   Date of Visit:  9/5/2019           After Visit Summary Signature Page    I have received my discharge instructions, and my questions have been answered. I have discussed any challenges I see with this plan with the nurse or doctor.    ..........................................................................................................................................  Patient/Patient Representative Signature      ..........................................................................................................................................  Patient Representative Print Name and Relationship to Patient    ..................................................               ................................................  Date                                   Time    ..........................................................................................................................................  Reviewed by Signature/Title    ...................................................              ..............................................  Date                                               Time          22EPIC Rev 08/18

## 2019-09-05 NOTE — ED PROVIDER NOTES
History     Chief Complaint   Patient presents with     Panic Attack     Depression     Suicidal     The history is provided by the patient and medical records.     Simon Mayers is a 48 year old male who comes in due to his worsening anxiety. He has been through several medication changes due to side effects although this summer he has been drinking more than normal. He had side effects to abilify and seroquel.  He is unsure if he has had side effects to elavil but has been feeling worse when it was restarted.  He has been tapering off it after being on it for a week. He took 25 mg last night after a weeks worth of 50 mg. He has been using hydroxyzine for sleep which has been helpful the last week. He has some passive suicidal thoughts about not wanting to be alive but no plan or intent. He states it is more when he gets racing thoughts and feels miserable that he just doesn't want to live like this. He has never attempted suicide before.  He has a history of PTSD.  He has medical marijuana for this but only takes it on occasion.  He has been drinking more.  He drinks every other night.  This has increased around the same time the anxiety has gotten worse.  He denies any withdrawal symptoms.  He denies any history of seizures.    Please see the 's assessment in [facility] from today (9/5/19) for further details.    I have reviewed the Medications, Allergies, Past Medical and Surgical History, and Social History in the Epic system.    Review of Systems   Constitutional: Negative for fever.   Respiratory: Negative for shortness of breath.    Cardiovascular: Negative for chest pain.   Gastrointestinal: Negative for abdominal pain.   Psychiatric/Behavioral: Positive for dysphoric mood. Negative for hallucinations, self-injury and suicidal ideas. The patient is nervous/anxious.    All other systems reviewed and are negative.      Physical Exam   BP: 123/82  Pulse: 74  Temp: 96.1  F (35.6  C)  Resp: 18  Weight: 81.2 kg  (179 lb)  SpO2: 100 %      Physical Exam   Constitutional: He appears well-developed and well-nourished.   Cardiovascular: Normal rate, regular rhythm and normal heart sounds.   Pulmonary/Chest: Effort normal and breath sounds normal. No respiratory distress.   Psychiatric: His speech is normal and behavior is normal. Judgment and thought content normal. His mood appears anxious. He is not actively hallucinating. Thought content is not paranoid and not delusional. Cognition and memory are normal. He exhibits a depressed mood. He expresses no homicidal and no suicidal ideation. He expresses no suicidal plans and no homicidal plans.   Simon is a 47 y/o male who looks his age. He is well groomed with good eye contact.    Nursing note and vitals reviewed.      ED Course        Procedures               Labs Ordered and Resulted from Time of ED Arrival Up to the Time of Departure from the ED   DRUG ABUSE SCREEN 6 CHEM DEP URINE ([facility]) - Abnormal; Notable for the following components:       Result Value    Cannabinoids Qual Urine Positive (*)     All other components within normal limits            Assessments & Plan (with Medical Decision Making)   Simon will be discharged home.  He is not an imminent risk to himself or others. He will finish his taper off of amitriptyline with two more days of 25 mg.  He will stop drinking. He believes he can do this on his own. He will minimize his medical marijuana use. He will follow up with his therapist and psychiatrist next week. He will start gabapentin 100 mg bid and 300 mg at bedtime. He was given information on the day treatment program at [place] and his is considering the DBT program through [facility] where he gets his current therapy and psychiatry.      I have reviewed the nursing notes.    I have reviewed the findings, diagnosis, plan and need for follow up with the patient.    New Prescriptions    No medications on file       Final diagnoses:   Anxiety   PTSD  (post-traumatic stress disorder)       9/5/2019   [facility], [place], EMERGENCY DEPARTMENT     Artemio Lopez MD  09/05/19 2094

## 2019-09-06 ASSESSMENT — ANXIETY QUESTIONNAIRES: GAD7 TOTAL SCORE: 20

## 2019-11-06 ENCOUNTER — HEALTH MAINTENANCE LETTER (OUTPATIENT)
Age: 49
End: 2019-11-06

## 2020-11-29 ENCOUNTER — HEALTH MAINTENANCE LETTER (OUTPATIENT)
Age: 50
End: 2020-11-29

## 2021-09-19 ENCOUNTER — HEALTH MAINTENANCE LETTER (OUTPATIENT)
Age: 51
End: 2021-09-19

## 2022-01-09 ENCOUNTER — HEALTH MAINTENANCE LETTER (OUTPATIENT)
Age: 52
End: 2022-01-09

## 2022-11-21 ENCOUNTER — HEALTH MAINTENANCE LETTER (OUTPATIENT)
Age: 52
End: 2022-11-21

## 2023-04-16 ENCOUNTER — HEALTH MAINTENANCE LETTER (OUTPATIENT)
Age: 53
End: 2023-04-16